# Patient Record
Sex: FEMALE | Race: WHITE
[De-identification: names, ages, dates, MRNs, and addresses within clinical notes are randomized per-mention and may not be internally consistent; named-entity substitution may affect disease eponyms.]

---

## 2018-01-26 ENCOUNTER — APPOINTMENT (OUTPATIENT)
Dept: BREAST CENTER | Facility: CLINIC | Age: 38
End: 2018-01-26
Payer: COMMERCIAL

## 2018-01-26 VITALS
WEIGHT: 156 LBS | BODY MASS INDEX: 25.07 KG/M2 | DIASTOLIC BLOOD PRESSURE: 74 MMHG | SYSTOLIC BLOOD PRESSURE: 130 MMHG | HEIGHT: 66 IN | HEART RATE: 90 BPM

## 2018-01-26 DIAGNOSIS — Z78.9 OTHER SPECIFIED HEALTH STATUS: ICD-10-CM

## 2018-01-26 DIAGNOSIS — Z82.49 FAMILY HISTORY OF ISCHEMIC HEART DISEASE AND OTHER DISEASES OF THE CIRCULATORY SYSTEM: ICD-10-CM

## 2018-01-26 DIAGNOSIS — Z86.39 PERSONAL HISTORY OF OTHER ENDOCRINE, NUTRITIONAL AND METABOLIC DISEASE: ICD-10-CM

## 2018-01-26 DIAGNOSIS — F15.90 OTHER STIMULANT USE, UNSPECIFIED, UNCOMPLICATED: ICD-10-CM

## 2018-01-26 DIAGNOSIS — Z87.42 PERSONAL HISTORY OF OTHER DISEASES OF THE FEMALE GENITAL TRACT: ICD-10-CM

## 2018-01-26 DIAGNOSIS — Z80.3 FAMILY HISTORY OF MALIGNANT NEOPLASM OF BREAST: ICD-10-CM

## 2018-01-26 DIAGNOSIS — R92.8 OTHER ABNORMAL AND INCONCLUSIVE FINDINGS ON DIAGNOSTIC IMAGING OF BREAST: ICD-10-CM

## 2018-01-26 PROBLEM — Z00.00 ENCOUNTER FOR PREVENTIVE HEALTH EXAMINATION: Status: ACTIVE | Noted: 2018-01-26

## 2018-01-26 PROCEDURE — 36415 COLL VENOUS BLD VENIPUNCTURE: CPT

## 2018-01-26 PROCEDURE — 99244 OFF/OP CNSLTJ NEW/EST MOD 40: CPT

## 2018-01-26 RX ORDER — LEVONORGESTREL / ETHINYL ESTRADIOL AND ETHINYL ESTRADIOL 150-30(84)
0.15-0.03 &0.01 KIT ORAL
Refills: 0 | Status: ACTIVE | COMMUNITY

## 2018-01-26 RX ORDER — TRIAMTERENE AND HYDROCHLOROTHIAZIDE 25; 37.5 MG/1; MG/1
37.5-25 TABLET ORAL
Qty: 30 | Refills: 0 | Status: DISCONTINUED | COMMUNITY
Start: 2017-10-27

## 2018-01-26 RX ORDER — LEVOTHYROXINE SODIUM 137 UG/1
TABLET ORAL
Refills: 0 | Status: ACTIVE | COMMUNITY

## 2018-01-26 RX ORDER — AZELASTINE HYDROCHLORIDE 205.5 UG/1
0.15 SPRAY, METERED NASAL
Qty: 30 | Refills: 0 | Status: DISCONTINUED | COMMUNITY
Start: 2017-12-13

## 2018-01-26 RX ORDER — IBUPROFEN 800 MG/1
800 TABLET, FILM COATED ORAL
Qty: 90 | Refills: 0 | Status: DISCONTINUED | COMMUNITY
Start: 2017-08-15

## 2018-01-26 RX ORDER — DULOXETINE HYDROCHLORIDE 60 MG/1
60 CAPSULE, DELAYED RELEASE ORAL
Refills: 0 | Status: ACTIVE | COMMUNITY

## 2018-01-26 RX ORDER — LEVOTHYROXINE SODIUM 0.03 MG/1
25 TABLET ORAL
Qty: 90 | Refills: 0 | Status: DISCONTINUED | COMMUNITY
Start: 2017-12-08

## 2018-01-26 RX ORDER — LEVONORGESTREL AND ETHINYL ESTRADIOL 100-20(84)
0.1-0.02 & 0.01 KIT ORAL
Qty: 91 | Refills: 0 | Status: DISCONTINUED | COMMUNITY
Start: 2017-11-08

## 2018-01-29 DIAGNOSIS — Z13.71 ENCOUNTER FOR NONPROCREATIVE SCREENING FOR GENETIC DISEASE CARRIER STATUS: ICD-10-CM

## 2018-02-06 ENCOUNTER — FORM ENCOUNTER (OUTPATIENT)
Age: 38
End: 2018-02-06

## 2018-02-07 ENCOUNTER — APPOINTMENT (OUTPATIENT)
Dept: MAMMOGRAPHY | Facility: CLINIC | Age: 38
End: 2018-02-07
Payer: COMMERCIAL

## 2018-02-07 ENCOUNTER — OUTPATIENT (OUTPATIENT)
Dept: OUTPATIENT SERVICES | Facility: HOSPITAL | Age: 38
LOS: 1 days | End: 2018-02-07
Payer: COMMERCIAL

## 2018-02-07 DIAGNOSIS — Z00.8 ENCOUNTER FOR OTHER GENERAL EXAMINATION: ICD-10-CM

## 2018-02-07 PROCEDURE — 19081 BX BREAST 1ST LESION STRTCTC: CPT

## 2018-02-07 PROCEDURE — G0279: CPT | Mod: 26

## 2018-02-07 PROCEDURE — 77065 DX MAMMO INCL CAD UNI: CPT

## 2018-02-07 PROCEDURE — 19081 BX BREAST 1ST LESION STRTCTC: CPT | Mod: 53,RT

## 2018-02-07 PROCEDURE — 77065 DX MAMMO INCL CAD UNI: CPT | Mod: 26,RT

## 2018-02-07 PROCEDURE — G0279: CPT

## 2018-02-08 ENCOUNTER — OTHER (OUTPATIENT)
Age: 38
End: 2018-02-08

## 2018-02-08 RX ORDER — ALPRAZOLAM 0.25 MG/1
0.25 TABLET ORAL
Qty: 2 | Refills: 0 | Status: ACTIVE | COMMUNITY
Start: 2018-02-08 | End: 1900-01-01

## 2018-02-19 ENCOUNTER — FORM ENCOUNTER (OUTPATIENT)
Age: 38
End: 2018-02-19

## 2018-02-20 ENCOUNTER — APPOINTMENT (OUTPATIENT)
Dept: MRI IMAGING | Facility: CLINIC | Age: 38
End: 2018-02-20
Payer: COMMERCIAL

## 2018-02-20 ENCOUNTER — OUTPATIENT (OUTPATIENT)
Dept: OUTPATIENT SERVICES | Facility: HOSPITAL | Age: 38
LOS: 1 days | End: 2018-02-20
Payer: COMMERCIAL

## 2018-02-20 DIAGNOSIS — R92.8 OTHER ABNORMAL AND INCONCLUSIVE FINDINGS ON DIAGNOSTIC IMAGING OF BREAST: ICD-10-CM

## 2018-02-20 DIAGNOSIS — Z80.3 FAMILY HISTORY OF MALIGNANT NEOPLASM OF BREAST: ICD-10-CM

## 2018-02-20 PROCEDURE — 0159T: CPT | Mod: 26

## 2018-02-20 PROCEDURE — A9585: CPT

## 2018-02-20 PROCEDURE — 77059 MRI BREAST BILATERAL: CPT | Mod: 26

## 2018-02-20 PROCEDURE — C8937: CPT

## 2018-02-20 PROCEDURE — C8908: CPT

## 2018-02-21 ENCOUNTER — OTHER (OUTPATIENT)
Age: 38
End: 2018-02-21

## 2018-08-14 ENCOUNTER — FORM ENCOUNTER (OUTPATIENT)
Age: 38
End: 2018-08-14

## 2018-08-15 ENCOUNTER — APPOINTMENT (OUTPATIENT)
Dept: MAMMOGRAPHY | Facility: CLINIC | Age: 38
End: 2018-08-15
Payer: COMMERCIAL

## 2018-08-15 ENCOUNTER — OUTPATIENT (OUTPATIENT)
Dept: OUTPATIENT SERVICES | Facility: HOSPITAL | Age: 38
LOS: 1 days | End: 2018-08-15
Payer: COMMERCIAL

## 2018-08-15 DIAGNOSIS — Z00.8 ENCOUNTER FOR OTHER GENERAL EXAMINATION: ICD-10-CM

## 2018-08-15 PROCEDURE — 77065 DX MAMMO INCL CAD UNI: CPT

## 2018-08-15 PROCEDURE — G0279: CPT

## 2018-08-15 PROCEDURE — 77065 DX MAMMO INCL CAD UNI: CPT | Mod: 26,RT

## 2018-08-15 PROCEDURE — 76642 ULTRASOUND BREAST LIMITED: CPT | Mod: 26,RT

## 2018-08-15 PROCEDURE — 76642 ULTRASOUND BREAST LIMITED: CPT

## 2018-08-15 PROCEDURE — G0279: CPT | Mod: 26

## 2019-02-19 ENCOUNTER — APPOINTMENT (OUTPATIENT)
Dept: MAMMOGRAPHY | Facility: CLINIC | Age: 39
End: 2019-02-19
Payer: COMMERCIAL

## 2019-02-19 ENCOUNTER — APPOINTMENT (OUTPATIENT)
Dept: ULTRASOUND IMAGING | Facility: CLINIC | Age: 39
End: 2019-02-19
Payer: COMMERCIAL

## 2019-02-19 ENCOUNTER — OUTPATIENT (OUTPATIENT)
Dept: OUTPATIENT SERVICES | Facility: HOSPITAL | Age: 39
LOS: 1 days | End: 2019-02-19
Payer: COMMERCIAL

## 2019-02-19 DIAGNOSIS — Z12.31 ENCOUNTER FOR SCREENING MAMMOGRAM FOR MALIGNANT NEOPLASM OF BREAST: ICD-10-CM

## 2019-02-19 DIAGNOSIS — Z80.3 FAMILY HISTORY OF MALIGNANT NEOPLASM OF BREAST: ICD-10-CM

## 2019-02-19 PROCEDURE — 77066 DX MAMMO INCL CAD BI: CPT | Mod: 26

## 2019-02-19 PROCEDURE — G0279: CPT | Mod: 26

## 2019-02-19 PROCEDURE — 76641 ULTRASOUND BREAST COMPLETE: CPT

## 2019-02-19 PROCEDURE — G0279: CPT

## 2019-02-19 PROCEDURE — 77066 DX MAMMO INCL CAD BI: CPT

## 2019-02-19 PROCEDURE — 76641 ULTRASOUND BREAST COMPLETE: CPT | Mod: 26,50

## 2020-02-21 ENCOUNTER — OUTPATIENT (OUTPATIENT)
Dept: OUTPATIENT SERVICES | Facility: HOSPITAL | Age: 40
LOS: 1 days | End: 2020-02-21
Payer: COMMERCIAL

## 2020-02-21 ENCOUNTER — APPOINTMENT (OUTPATIENT)
Dept: MAMMOGRAPHY | Facility: CLINIC | Age: 40
End: 2020-02-21
Payer: COMMERCIAL

## 2020-02-21 ENCOUNTER — APPOINTMENT (OUTPATIENT)
Dept: ULTRASOUND IMAGING | Facility: CLINIC | Age: 40
End: 2020-02-21
Payer: COMMERCIAL

## 2020-02-21 DIAGNOSIS — Z00.00 ENCOUNTER FOR GENERAL ADULT MEDICAL EXAMINATION WITHOUT ABNORMAL FINDINGS: ICD-10-CM

## 2020-02-21 PROCEDURE — 77067 SCR MAMMO BI INCL CAD: CPT | Mod: 26

## 2020-02-21 PROCEDURE — 77063 BREAST TOMOSYNTHESIS BI: CPT | Mod: 26

## 2020-02-21 PROCEDURE — 77063 BREAST TOMOSYNTHESIS BI: CPT

## 2020-02-21 PROCEDURE — 76641 ULTRASOUND BREAST COMPLETE: CPT | Mod: 26,50

## 2020-02-21 PROCEDURE — 76641 ULTRASOUND BREAST COMPLETE: CPT

## 2020-02-21 PROCEDURE — 77067 SCR MAMMO BI INCL CAD: CPT

## 2021-03-29 ENCOUNTER — OUTPATIENT (OUTPATIENT)
Dept: OUTPATIENT SERVICES | Facility: HOSPITAL | Age: 41
LOS: 1 days | End: 2021-03-29
Payer: COMMERCIAL

## 2021-03-29 ENCOUNTER — APPOINTMENT (OUTPATIENT)
Dept: MAMMOGRAPHY | Facility: CLINIC | Age: 41
End: 2021-03-29

## 2021-03-29 ENCOUNTER — APPOINTMENT (OUTPATIENT)
Dept: ULTRASOUND IMAGING | Facility: CLINIC | Age: 41
End: 2021-03-29

## 2021-03-29 DIAGNOSIS — Z00.8 ENCOUNTER FOR OTHER GENERAL EXAMINATION: ICD-10-CM

## 2021-03-29 PROCEDURE — 76641 ULTRASOUND BREAST COMPLETE: CPT

## 2021-03-29 PROCEDURE — 77067 SCR MAMMO BI INCL CAD: CPT

## 2021-03-29 PROCEDURE — 77063 BREAST TOMOSYNTHESIS BI: CPT

## 2022-03-31 ENCOUNTER — APPOINTMENT (OUTPATIENT)
Dept: ULTRASOUND IMAGING | Facility: CLINIC | Age: 42
End: 2022-03-31

## 2022-03-31 ENCOUNTER — APPOINTMENT (OUTPATIENT)
Dept: MAMMOGRAPHY | Facility: CLINIC | Age: 42
End: 2022-03-31

## 2022-03-31 ENCOUNTER — OUTPATIENT (OUTPATIENT)
Dept: OUTPATIENT SERVICES | Facility: HOSPITAL | Age: 42
LOS: 1 days | End: 2022-03-31
Payer: COMMERCIAL

## 2022-03-31 DIAGNOSIS — Z00.8 ENCOUNTER FOR OTHER GENERAL EXAMINATION: ICD-10-CM

## 2022-03-31 PROCEDURE — 76641 ULTRASOUND BREAST COMPLETE: CPT

## 2022-03-31 PROCEDURE — 77063 BREAST TOMOSYNTHESIS BI: CPT

## 2022-03-31 PROCEDURE — 77067 SCR MAMMO BI INCL CAD: CPT

## 2022-10-25 ENCOUNTER — APPOINTMENT (OUTPATIENT)
Dept: ORTHOPEDIC SURGERY | Facility: CLINIC | Age: 42
End: 2022-10-25

## 2022-10-25 VITALS — WEIGHT: 156 LBS | BODY MASS INDEX: 25.07 KG/M2 | HEIGHT: 66 IN

## 2022-10-25 DIAGNOSIS — M77.8 OTHER ENTHESOPATHIES, NOT ELSEWHERE CLASSIFIED: ICD-10-CM

## 2022-10-25 PROCEDURE — 73660 X-RAY EXAM OF TOE(S): CPT | Mod: RT

## 2022-10-25 PROCEDURE — 99203 OFFICE O/P NEW LOW 30 MIN: CPT

## 2022-10-25 NOTE — PHYSICAL EXAM
[Mild] : mild swelling of MTP joint/great toe [1st] : 1st [NL (40)] : plantar flexion 40 degrees [NL 30)] : inversion 30 degrees [NL (20)] : eversion 20 degrees [5___] : [facility] 5[unfilled]/5 [2+] : posterior tibialis pulse: 2+ [Normal] : saphenous nerve sensation normal [] : non-antalgic [Right] : right foot [Loose body] : Loose body [FreeTextEntry8] : tenderness at the fist IP joint [de-identified] : small nonaggressive appearing mike growth at the distal phalanx of the great toe.

## 2022-10-25 NOTE — HISTORY OF PRESENT ILLNESS
[2] : 2 [de-identified] : 42 YF with pain and swelling right big toe for a few days.  No specific injury.  Possibly over use in the gym.  No fever/chills.  Pain with some shoe wear.  Not really causing a limp. [FreeTextEntry1] : right toe  [FreeTextEntry6] : sore

## 2022-10-25 NOTE — ASSESSMENT
[FreeTextEntry1] : She is referred for an MRI of the right foot to evaluate mike growth at the distal phalanx of the great toe.  Activities as tolerated.  Motrin 600 mg tid.  f/u with Dr Brewster.

## 2022-10-27 ENCOUNTER — FORM ENCOUNTER (OUTPATIENT)
Age: 42
End: 2022-10-27

## 2022-10-27 RX ORDER — IBUPROFEN 600 MG/1
600 TABLET, FILM COATED ORAL
Qty: 60 | Refills: 0 | Status: ACTIVE | COMMUNITY
Start: 2022-10-27 | End: 1900-01-01

## 2022-10-28 ENCOUNTER — APPOINTMENT (OUTPATIENT)
Dept: MRI IMAGING | Facility: CLINIC | Age: 42
End: 2022-10-28

## 2022-10-28 PROCEDURE — 73718 MRI LOWER EXTREMITY W/O DYE: CPT | Mod: RT

## 2022-11-09 ENCOUNTER — APPOINTMENT (OUTPATIENT)
Dept: ORTHOPEDIC SURGERY | Facility: CLINIC | Age: 42
End: 2022-11-09

## 2022-11-09 PROCEDURE — 99214 OFFICE O/P EST MOD 30 MIN: CPT

## 2022-11-09 NOTE — ASSESSMENT
[FreeTextEntry1] : MRI RT foot with contrast to evaluate mass of great toe as recommended by radiology.\par \par Further treatment pending MRI results. \par \par Discussed possible aspiration vs. surgical excision pending MRI results.

## 2022-11-09 NOTE — PHYSICAL EXAM
[Right] : right foot and ankle [NL (40)] : plantar flexion 40 degrees [NL 30)] : inversion 30 degrees [NL (20)] : eversion 20 degrees [5___] : [facility] 5[unfilled]/5 [2+] : posterior tibialis pulse: 2+ [Normal] : saphenous nerve sensation normal [] : non-antalgic [FreeTextEntry3] : Fairly well circumscribed mass medial to IP joint, non-tender.

## 2022-11-09 NOTE — HISTORY OF PRESENT ILLNESS
[2] : 2 [de-identified] : Pt is a 42 year old F who presents today for consultation of their R great toe. Denies trauma or injury but noticed swelling in the great toe since summer 2022. It may have started with overuse in the gym.  No fever/chills reported. She was evaluated at [facility] on 10/25/22 and MRI recommended. WB without assistive device. Ice to affected area. NSAIDS prn. No previous injury/problem with RT foot.  [FreeTextEntry1] : right toe  [FreeTextEntry6] : sore

## 2022-11-09 NOTE — DATA REVIEWED
[MRI] : MRI [Right] : of the right [Foot] : foot [Report was reviewed and noted in the chart] : The report was reviewed and noted in the chart [I independently reviewed and interpreted images and report] : I independently reviewed and interpreted images and report [FreeTextEntry1] : 9 x 3 mm septated fluid signal intensity lesion within the dorsal medial subcutaneous fat overlying the \par interphalangeal joint and base of proximal phalanx of the first toe with no osseous involvement. There is diffuse \par adjacent soft tissue scarring and edema. The lesion is most compatible with soft tissue ganglion. Ultrasound or \par additional imaging with contrast suggested to assure there is no solid lesion mimicking ganglion.

## 2022-11-12 ENCOUNTER — APPOINTMENT (OUTPATIENT)
Dept: MRI IMAGING | Facility: CLINIC | Age: 42
End: 2022-11-12

## 2022-11-16 ENCOUNTER — TRANSCRIPTION ENCOUNTER (OUTPATIENT)
Age: 42
End: 2022-11-16

## 2022-11-16 ENCOUNTER — APPOINTMENT (OUTPATIENT)
Dept: MRI IMAGING | Facility: CLINIC | Age: 42
End: 2022-11-16

## 2022-11-21 ENCOUNTER — FORM ENCOUNTER (OUTPATIENT)
Age: 42
End: 2022-11-21

## 2022-11-22 ENCOUNTER — APPOINTMENT (OUTPATIENT)
Dept: MRI IMAGING | Facility: CLINIC | Age: 42
End: 2022-11-22

## 2022-11-22 PROCEDURE — 73720 MRI LWR EXTREMITY W/O&W/DYE: CPT

## 2022-11-23 ENCOUNTER — APPOINTMENT (OUTPATIENT)
Dept: ORTHOPEDIC SURGERY | Facility: CLINIC | Age: 42
End: 2022-11-23

## 2022-11-28 ENCOUNTER — APPOINTMENT (OUTPATIENT)
Dept: ORTHOPEDIC SURGERY | Facility: CLINIC | Age: 42
End: 2022-11-28

## 2022-11-28 DIAGNOSIS — R22.41 LOCALIZED SWELLING, MASS AND LUMP, RIGHT LOWER LIMB: ICD-10-CM

## 2022-11-28 PROCEDURE — 76881 US COMPL JOINT R-T W/IMG: CPT

## 2022-11-28 PROCEDURE — 99214 OFFICE O/P EST MOD 30 MIN: CPT | Mod: 25

## 2022-11-28 NOTE — DATA REVIEWED
[MRI] : MRI [Right] : of the right [Foot] : foot [Report was reviewed and noted in the chart] : The report was reviewed and noted in the chart [FreeTextEntry1] : 6 mm AP dimension x 2 mm transverse dimension x 11 mm lesion within the dorsal radial of the nailbed over \par the distal phalanx of the first toe extending to the radial soft tissues overlying the interphalangeal joint. There is \par chronic smooth erosion on the distal phalanx with no marrow edema. Suspicion for subtle enhancement \par following contrast. This could be enhancing within the lesion or in the adjacent soft tissues compressed by the \par lesion. Differential could include complex ganglion, less likely epidermoid lesion. Histologic correlation is \par suggested.

## 2022-11-28 NOTE — HISTORY OF PRESENT ILLNESS
[2] : 2 [de-identified] : Pt is a 42 year old F who returns today for f/u of their R great toe. Denies trauma or injury but noticed swelling in the great toe since summer 2022 possibly from overuse in the gym.  No fever/chills reported. She was evaluated at [facility] on 10/25/22 and MRI recommended. WB without assistive device. Ice to affected area. NSAIDS prn. No previous injury/problem with RT foot.  [FreeTextEntry1] : right toe  [FreeTextEntry6] : sore

## 2022-11-28 NOTE — ASSESSMENT
[FreeTextEntry1] : Mass has essentially resolved. U/s used for further evaluation with remnants of mass seen. Nothing to aspirate. \par accomodative shoes as needed. \par f/u if symptoms symptoms.

## 2022-11-28 NOTE — PHYSICAL EXAM
[Right] : right foot and ankle [NL (40)] : plantar flexion 40 degrees [NL 30)] : inversion 30 degrees [NL (20)] : eversion 20 degrees [5___] : [facility] 5[unfilled]/5 [2+] : posterior tibialis pulse: 2+ [Normal] : saphenous nerve sensation normal [] : non-antalgic [FreeTextEntry3] : VIRTUALLY RESOLVED MASS ON DORSAL GREAT TOE

## 2023-04-11 ENCOUNTER — APPOINTMENT (OUTPATIENT)
Dept: ULTRASOUND IMAGING | Facility: CLINIC | Age: 43
End: 2023-04-11
Payer: COMMERCIAL

## 2023-04-11 ENCOUNTER — APPOINTMENT (OUTPATIENT)
Dept: MAMMOGRAPHY | Facility: CLINIC | Age: 43
End: 2023-04-11
Payer: COMMERCIAL

## 2023-04-11 PROCEDURE — 77063 BREAST TOMOSYNTHESIS BI: CPT

## 2023-04-11 PROCEDURE — 76641 ULTRASOUND BREAST COMPLETE: CPT | Mod: 50

## 2023-04-11 PROCEDURE — 77067 SCR MAMMO BI INCL CAD: CPT

## 2024-07-01 ENCOUNTER — APPOINTMENT (OUTPATIENT)
Dept: MAMMOGRAPHY | Facility: CLINIC | Age: 44
End: 2024-07-01
Payer: COMMERCIAL

## 2024-07-01 ENCOUNTER — APPOINTMENT (OUTPATIENT)
Dept: ULTRASOUND IMAGING | Facility: CLINIC | Age: 44
End: 2024-07-01
Payer: COMMERCIAL

## 2024-07-01 PROCEDURE — 77063 BREAST TOMOSYNTHESIS BI: CPT

## 2024-07-01 PROCEDURE — 76641 ULTRASOUND BREAST COMPLETE: CPT | Mod: 50

## 2024-07-01 PROCEDURE — 77067 SCR MAMMO BI INCL CAD: CPT

## 2025-07-05 ENCOUNTER — APPOINTMENT (OUTPATIENT)
Dept: ULTRASOUND IMAGING | Facility: CLINIC | Age: 45
End: 2025-07-05
Payer: COMMERCIAL

## 2025-07-05 ENCOUNTER — APPOINTMENT (OUTPATIENT)
Dept: MAMMOGRAPHY | Facility: CLINIC | Age: 45
End: 2025-07-05
Payer: COMMERCIAL

## 2025-07-05 PROCEDURE — 77067 SCR MAMMO BI INCL CAD: CPT

## 2025-07-05 PROCEDURE — 76641 ULTRASOUND BREAST COMPLETE: CPT | Mod: 50

## 2025-07-05 PROCEDURE — 77063 BREAST TOMOSYNTHESIS BI: CPT
